# Patient Record
Sex: FEMALE | Race: WHITE | NOT HISPANIC OR LATINO | URBAN - METROPOLITAN AREA
[De-identification: names, ages, dates, MRNs, and addresses within clinical notes are randomized per-mention and may not be internally consistent; named-entity substitution may affect disease eponyms.]

---
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
## 2021-12-01 PROCEDURE — 88305 TISSUE EXAM BY PATHOLOGIST: CPT

## 2021-12-02 ENCOUNTER — HOSPITAL ENCOUNTER (OUTPATIENT)
Dept: LAB | Age: 21
Discharge: HOME | End: 2021-12-02

## 2021-12-21 ENCOUNTER — TELEPHONE (OUTPATIENT)
Dept: NUTRITION | Age: 21
End: 2021-12-21

## 2021-12-21 NOTE — TELEPHONE ENCOUNTER
Nutrition Counseling: Contacted pt regarding referral several times. See notes documented in Nutrition Counseling Referral for details. No further follow-up contact from pt. Will close referral for this office and notify referring provider.     [address] [facility]  [phone]